# Patient Record
Sex: FEMALE | URBAN - METROPOLITAN AREA
[De-identification: names, ages, dates, MRNs, and addresses within clinical notes are randomized per-mention and may not be internally consistent; named-entity substitution may affect disease eponyms.]

---

## 2022-05-23 ENCOUNTER — APPOINTMENT (OUTPATIENT)
Dept: URBAN - METROPOLITAN AREA CLINIC 258 | Age: 47
Setting detail: DERMATOLOGY
End: 2022-05-23

## 2022-05-23 DIAGNOSIS — Z41.9 ENCOUNTER FOR PROCEDURE FOR PURPOSES OTHER THAN REMEDYING HEALTH STATE, UNSPECIFIED: ICD-10-CM

## 2022-05-23 PROCEDURE — OTHER COSMETIC CONSULTATION: THERMIVA: OTHER

## 2022-05-23 NOTE — HPI: COSMETIC PROCEDURE
Additional History: Has stress incontinence that started recently.  Has 4 children ages 22-9 and all vaginal births. She had talked to her gyno but he suggested a treatment that she thought was too invasive. We discussed procedure and how it works.

## 2022-05-23 NOTE — PROCEDURE: COSMETIC CONSULTATION: THERMIVA
Price (Use Numbers Only, No Special Characters Or $): 3794 Consultation Charge $ (Use Numbers Only, No Text Please.): 8920